# Patient Record
Sex: MALE | Race: OTHER | HISPANIC OR LATINO | URBAN - METROPOLITAN AREA
[De-identification: names, ages, dates, MRNs, and addresses within clinical notes are randomized per-mention and may not be internally consistent; named-entity substitution may affect disease eponyms.]

---

## 2024-11-26 ENCOUNTER — EMERGENCY (EMERGENCY)
Facility: HOSPITAL | Age: 59
LOS: 1 days | Discharge: HOME | End: 2024-11-26
Attending: STUDENT IN AN ORGANIZED HEALTH CARE EDUCATION/TRAINING PROGRAM
Payer: SELF-PAY

## 2024-11-26 VITALS
TEMPERATURE: 98 F | SYSTOLIC BLOOD PRESSURE: 133 MMHG | OXYGEN SATURATION: 97 % | HEART RATE: 76 BPM | DIASTOLIC BLOOD PRESSURE: 75 MMHG | RESPIRATION RATE: 20 BRPM

## 2024-11-26 VITALS
RESPIRATION RATE: 22 BRPM | SYSTOLIC BLOOD PRESSURE: 138 MMHG | OXYGEN SATURATION: 100 % | DIASTOLIC BLOOD PRESSURE: 88 MMHG | HEIGHT: 74 IN | WEIGHT: 214.95 LBS | TEMPERATURE: 98 F | HEART RATE: 85 BPM

## 2024-11-26 LAB
ALBUMIN SERPL ELPH-MCNC: 4.3 G/DL — SIGNIFICANT CHANGE UP (ref 3.5–5)
ALP SERPL-CCNC: 54 U/L — SIGNIFICANT CHANGE UP (ref 40–120)
ALT FLD-CCNC: 31 U/L DA — SIGNIFICANT CHANGE UP (ref 10–60)
ANION GAP SERPL CALC-SCNC: 6 MMOL/L — SIGNIFICANT CHANGE UP (ref 5–17)
APPEARANCE UR: ABNORMAL
AST SERPL-CCNC: 23 U/L — SIGNIFICANT CHANGE UP (ref 10–40)
BACTERIA # UR AUTO: ABNORMAL /HPF
BASOPHILS # BLD AUTO: 0.03 K/UL — SIGNIFICANT CHANGE UP (ref 0–0.2)
BASOPHILS NFR BLD AUTO: 0.3 % — SIGNIFICANT CHANGE UP (ref 0–2)
BILIRUB SERPL-MCNC: 0.9 MG/DL — SIGNIFICANT CHANGE UP (ref 0.2–1.2)
BILIRUB UR-MCNC: NEGATIVE — SIGNIFICANT CHANGE UP
BUN SERPL-MCNC: 21 MG/DL — HIGH (ref 7–18)
CALCIUM SERPL-MCNC: 9.4 MG/DL — SIGNIFICANT CHANGE UP (ref 8.4–10.5)
CHLORIDE SERPL-SCNC: 110 MMOL/L — HIGH (ref 96–108)
CO2 SERPL-SCNC: 26 MMOL/L — SIGNIFICANT CHANGE UP (ref 22–31)
COLOR SPEC: YELLOW — SIGNIFICANT CHANGE UP
CREAT SERPL-MCNC: 1.71 MG/DL — HIGH (ref 0.5–1.3)
DIFF PNL FLD: ABNORMAL
EGFR: 45 ML/MIN/1.73M2 — LOW
EOSINOPHIL # BLD AUTO: 0.02 K/UL — SIGNIFICANT CHANGE UP (ref 0–0.5)
EOSINOPHIL NFR BLD AUTO: 0.2 % — SIGNIFICANT CHANGE UP (ref 0–6)
EPI CELLS # UR: SIGNIFICANT CHANGE UP
GLUCOSE SERPL-MCNC: 177 MG/DL — HIGH (ref 70–99)
GLUCOSE UR QL: 100 MG/DL
HCT VFR BLD CALC: 40.4 % — SIGNIFICANT CHANGE UP (ref 39–50)
HGB BLD-MCNC: 13.6 G/DL — SIGNIFICANT CHANGE UP (ref 13–17)
IMM GRANULOCYTES NFR BLD AUTO: 0.3 % — SIGNIFICANT CHANGE UP (ref 0–0.9)
KETONES UR-MCNC: 15 MG/DL
LEUKOCYTE ESTERASE UR-ACNC: NEGATIVE — SIGNIFICANT CHANGE UP
LIDOCAIN IGE QN: 63 U/L — SIGNIFICANT CHANGE UP (ref 13–75)
LYMPHOCYTES # BLD AUTO: 0.77 K/UL — LOW (ref 1–3.3)
LYMPHOCYTES # BLD AUTO: 8.8 % — LOW (ref 13–44)
MCHC RBC-ENTMCNC: 31.9 PG — SIGNIFICANT CHANGE UP (ref 27–34)
MCHC RBC-ENTMCNC: 33.7 G/DL — SIGNIFICANT CHANGE UP (ref 32–36)
MCV RBC AUTO: 94.8 FL — SIGNIFICANT CHANGE UP (ref 80–100)
MONOCYTES # BLD AUTO: 0.35 K/UL — SIGNIFICANT CHANGE UP (ref 0–0.9)
MONOCYTES NFR BLD AUTO: 4 % — SIGNIFICANT CHANGE UP (ref 2–14)
NEUTROPHILS # BLD AUTO: 7.58 K/UL — HIGH (ref 1.8–7.4)
NEUTROPHILS NFR BLD AUTO: 86.4 % — HIGH (ref 43–77)
NITRITE UR-MCNC: NEGATIVE — SIGNIFICANT CHANGE UP
NRBC # BLD: 0 /100 WBCS — SIGNIFICANT CHANGE UP (ref 0–0)
PH UR: 8.5 (ref 5–8)
PLATELET # BLD AUTO: 252 K/UL — SIGNIFICANT CHANGE UP (ref 150–400)
POTASSIUM SERPL-MCNC: 3.8 MMOL/L — SIGNIFICANT CHANGE UP (ref 3.5–5.3)
POTASSIUM SERPL-SCNC: 3.8 MMOL/L — SIGNIFICANT CHANGE UP (ref 3.5–5.3)
PROT SERPL-MCNC: 8.1 G/DL — SIGNIFICANT CHANGE UP (ref 6–8.3)
PROT UR-MCNC: ABNORMAL MG/DL
RBC # BLD: 4.26 M/UL — SIGNIFICANT CHANGE UP (ref 4.2–5.8)
RBC # FLD: 13.5 % — SIGNIFICANT CHANGE UP (ref 10.3–14.5)
RBC CASTS # UR COMP ASSIST: ABNORMAL /HPF
SODIUM SERPL-SCNC: 142 MMOL/L — SIGNIFICANT CHANGE UP (ref 135–145)
SP GR SPEC: 1.03 — SIGNIFICANT CHANGE UP (ref 1–1.03)
UROBILINOGEN FLD QL: 1 MG/DL — SIGNIFICANT CHANGE UP (ref 0.2–1)
WBC # BLD: 8.78 K/UL — SIGNIFICANT CHANGE UP (ref 3.8–10.5)
WBC # FLD AUTO: 8.78 K/UL — SIGNIFICANT CHANGE UP (ref 3.8–10.5)
WBC UR QL: 1 /HPF — SIGNIFICANT CHANGE UP (ref 0–5)

## 2024-11-26 PROCEDURE — 99285 EMERGENCY DEPT VISIT HI MDM: CPT

## 2024-11-26 PROCEDURE — 36415 COLL VENOUS BLD VENIPUNCTURE: CPT

## 2024-11-26 PROCEDURE — 93005 ELECTROCARDIOGRAM TRACING: CPT

## 2024-11-26 PROCEDURE — 81001 URINALYSIS AUTO W/SCOPE: CPT

## 2024-11-26 PROCEDURE — 74177 CT ABD & PELVIS W/CONTRAST: CPT | Mod: 26,MC

## 2024-11-26 PROCEDURE — 96374 THER/PROPH/DIAG INJ IV PUSH: CPT

## 2024-11-26 PROCEDURE — 80053 COMPREHEN METABOLIC PANEL: CPT

## 2024-11-26 PROCEDURE — 99285 EMERGENCY DEPT VISIT HI MDM: CPT | Mod: 25

## 2024-11-26 PROCEDURE — 85025 COMPLETE CBC W/AUTO DIFF WBC: CPT

## 2024-11-26 PROCEDURE — 74177 CT ABD & PELVIS W/CONTRAST: CPT | Mod: MC

## 2024-11-26 PROCEDURE — 96375 TX/PRO/DX INJ NEW DRUG ADDON: CPT

## 2024-11-26 PROCEDURE — 83690 ASSAY OF LIPASE: CPT

## 2024-11-26 PROCEDURE — 96376 TX/PRO/DX INJ SAME DRUG ADON: CPT

## 2024-11-26 RX ORDER — ONDANSETRON HYDROCHLORIDE 2 MG/ML
4 INJECTION, SOLUTION INTRAMUSCULAR; INTRAVENOUS ONCE
Refills: 0 | Status: COMPLETED | OUTPATIENT
Start: 2024-11-26 | End: 2024-11-26

## 2024-11-26 RX ORDER — SODIUM CHLORIDE 9 MG/ML
1000 INJECTION, SOLUTION INTRAMUSCULAR; INTRAVENOUS; SUBCUTANEOUS ONCE
Refills: 0 | Status: COMPLETED | OUTPATIENT
Start: 2024-11-26 | End: 2024-11-26

## 2024-11-26 RX ORDER — OXYCODONE AND ACETAMINOPHEN 7.5; 325 MG/1; MG/1
1 TABLET ORAL
Qty: 10 | Refills: 0
Start: 2024-11-26

## 2024-11-26 RX ORDER — MORPHINE SULFATE 30 MG/1
4 TABLET, EXTENDED RELEASE ORAL ONCE
Refills: 0 | Status: COMPLETED | OUTPATIENT
Start: 2024-11-26 | End: 2024-11-26

## 2024-11-26 RX ORDER — TAMSULOSIN HCL 0.4 MG
1 CAPSULE ORAL
Qty: 20 | Refills: 0
Start: 2024-11-26

## 2024-11-26 RX ORDER — MORPHINE SULFATE 30 MG/1
4 TABLET, EXTENDED RELEASE ORAL ONCE
Refills: 0 | Status: DISCONTINUED | OUTPATIENT
Start: 2024-11-26 | End: 2024-11-26

## 2024-11-26 RX ADMIN — MORPHINE SULFATE 4 MILLIGRAM(S): 30 TABLET, EXTENDED RELEASE ORAL at 14:40

## 2024-11-26 RX ADMIN — ONDANSETRON HYDROCHLORIDE 4 MILLIGRAM(S): 2 INJECTION, SOLUTION INTRAMUSCULAR; INTRAVENOUS at 12:23

## 2024-11-26 RX ADMIN — ONDANSETRON HYDROCHLORIDE 4 MILLIGRAM(S): 2 INJECTION, SOLUTION INTRAMUSCULAR; INTRAVENOUS at 14:40

## 2024-11-26 RX ADMIN — MORPHINE SULFATE 4 MILLIGRAM(S): 30 TABLET, EXTENDED RELEASE ORAL at 12:22

## 2024-11-26 RX ADMIN — SODIUM CHLORIDE 1000 MILLILITER(S): 9 INJECTION, SOLUTION INTRAMUSCULAR; INTRAVENOUS; SUBCUTANEOUS at 12:23

## 2024-11-26 RX ADMIN — SODIUM CHLORIDE 1000 MILLILITER(S): 9 INJECTION, SOLUTION INTRAMUSCULAR; INTRAVENOUS; SUBCUTANEOUS at 19:06

## 2024-11-26 NOTE — CONSULT NOTE ADULT - SUBJECTIVE AND OBJECTIVE BOX
58 yo male pmhx CAD 3 mo ago had cardiac stents placed, now on plavix, to ER c/o n/v and upper abd pain l>r, associated n/v clear liquid x 1 day. no flank pain, no dysuria no gross hematuria.  Pt states he never had hx kidney stones in past. At present he states pain is much improved from this am. no n/v   no cp no sob no fever no dizzyness.    ICU Vital Signs Last 24 Hrs  T(C): 36.7 (26 Nov 2024 17:14), Max: 36.7 (26 Nov 2024 17:14)  T(F): 98 (26 Nov 2024 17:14), Max: 98 (26 Nov 2024 17:14)  HR: 76 (26 Nov 2024 17:14) (76 - 85)  BP: 133/75 (26 Nov 2024 17:14) (133/75 - 138/88)  BP(mean): --  ABP: --  ABP(mean): --  RR: 20 (26 Nov 2024 17:14) (20 - 22)  SpO2: 97% (26 Nov 2024 17:14) (97% - 100%)    O2 Parameters below as of 26 Nov 2024 17:14  Patient On (Oxygen Delivery Method): room air        Alert nad  Abd:soft nt nd no cvat  no penile/scrotal swelling or erythema                          13.6   8.78  )-----------( 252      ( 26 Nov 2024 13:38 )             40.4   11-26    142  |  110[H]  |  21[H]  ----------------------------<  177[H]  3.8   |  26  |  1.71[H]    Ca    9.4      26 Nov 2024 12:25    TPro  8.1  /  Alb  4.3  /  TBili  0.9  /  DBili  x   /  AST  23  /  ALT  31  /  AlkPhos  54  11-26  < from: CT Abdomen and Pelvis w/ IV Cont (11.26.24 @ 15:30) >  KIDNEYS/URETERS: Approximately 1.5 cm sized left UPJ calculus with   resultant mild hydronephrosis and delayed nephrogram as well as   perinephric fluid stranding. Multiple small nonobstructing calculi lower   pole of the left kidney. A combination of small cysts and indeterminate   hypodense foci right kidney.    BLADDER: Within normal limits.  REPRODUCTIVE ORGANS: Prostate is enlarged.    BOWEL: No bowel obstruction. Appendix is normal.  PERITONEUM/RETROPERITONEUM: Within normal limits.  VESSELS: Within normal limits.  LYMPH NODES: No lymphadenopathy.  ABDOMINAL WALL: Small fat-containing umbilical and supraumbilical hernias.  BONES: Within normal limits.    IMPRESSION:  Left UPJ calculus with resultant hydronephrosis.  Additional findings as above.      < end of copied text >   58 yo male pmhx CAD 3 mo ago had cardiac stents placed, now on plavix, to ER c/o n/v and upper abd pain l>r, associated n/v clear liquid x 1 day. no flank pain, no dysuria no gross hematuria.  Pt states he never had hx kidney stones in past. At present he states pain is much improved from this am. no n/v   no cp no sob no fever no dizziness. Lives in [facility]    ICU Vital Signs Last 24 Hrs  T(C): 36.7 (26 Nov 2024 17:14), Max: 36.7 (26 Nov 2024 17:14)  T(F): 98 (26 Nov 2024 17:14), Max: 98 (26 Nov 2024 17:14)  HR: 76 (26 Nov 2024 17:14) (76 - 85)  BP: 133/75 (26 Nov 2024 17:14) (133/75 - 138/88)  BP(mean): --  ABP: --  ABP(mean): --  RR: 20 (26 Nov 2024 17:14) (20 - 22)  SpO2: 97% (26 Nov 2024 17:14) (97% - 100%)    O2 Parameters below as of 26 Nov 2024 17:14  Patient On (Oxygen Delivery Method): room air        Alert nad  Abd:soft nt nd no cvat  no penile/scrotal swelling or erythema                          13.6   8.78  )-----------( 252      ( 26 Nov 2024 13:38 )             40.4   11-26    142  |  110[H]  |  21[H]  ----------------------------<  177[H]  3.8   |  26  |  1.71[H]    Ca    9.4      26 Nov 2024 12:25    TPro  8.1  /  Alb  4.3  /  TBili  0.9  /  DBili  x   /  AST  23  /  ALT  31  /  AlkPhos  54  11-26  < from: CT Abdomen and Pelvis w/ IV Cont (11.26.24 @ 15:30) >  KIDNEYS/URETERS: Approximately 1.5 cm sized left UPJ calculus with   resultant mild hydronephrosis and delayed nephrogram as well as   perinephric fluid stranding. Multiple small nonobstructing calculi lower   pole of the left kidney. A combination of small cysts and indeterminate   hypodense foci right kidney.    BLADDER: Within normal limits.  REPRODUCTIVE ORGANS: Prostate is enlarged.    BOWEL: No bowel obstruction. Appendix is normal.  PERITONEUM/RETROPERITONEUM: Within normal limits.  VESSELS: Within normal limits.  LYMPH NODES: No lymphadenopathy.  ABDOMINAL WALL: Small fat-containing umbilical and supraumbilical hernias.  BONES: Within normal limits.    IMPRESSION:  Left UPJ calculus with resultant hydronephrosis.  Additional findings as above.      < end of copied text >

## 2024-11-26 NOTE — ED PROVIDER NOTE - NSFOLLOWUPINSTRUCTIONS_ED_ALL_ED_FT
Cálculos renales    LO QUE NECESITA SABER:    ¿Qué son los cálculos renales?Los cálculos renales se demi en el sistema urinario cuando el agua y los residuos de la orina no están yan balanceados. Cuando esto sucede, ciertos tipos de concepcion de desecho se separan de la orina. Los concepcion se acumulan y demi piedras en los riñones. Los cálculos renales pueden estar compuestos de ácido úrico, calcio, fosfato o concepcion de oxalato. Podría tener más de un cálculo.  Cálculos renales    ¿Qué aumenta mi riesgo de cálculos renales?    No isacc suficientes líquidos (especialmente agua) todos los días    Tener infecciones frecuentes del tracto urinario    Demasiada cantidad de ciertos alimentos, sujatha carne, sal, nueces y chocolate    Obesidad    Ciertos medicamentos, sujatha diuréticos, esteroides y antiácidos    Antecedentes familiares de cálculos renales    Nacer con un trastorno renal o intestinal  ¿Cuáles son los signos y síntomas de los cálculos renales?    Dolor en la parte media de la espalda que se mueve a través de un costado o que podría propagarse a la niels    Náuseas y vómitos    Necesidad de orinar con frecuencia, sensación de ardor al orinar u orina color ervin o titus    Sensibilidad en la parte inferior de la espalda, en el costado o en el estómago  ¿Cómo se diagnostican los cálculos renales?El médico va a hacerle preguntas sobre allison benja y alimentos habituales. El médico podría derivarlo a un urólogo. Es posible que usted necesite exámenes para determinar el tipo de cálculos renales que tiene. Los exámenes pueden mostrar el tamaño de los cálculos y en dónde se encuentran en el sistema urinario. Usted podría necesitar más de brea de los siguientes:    Los análisis de orinapodrían mostrar si usted tiene rhys en la orina. También podrían mostrar altas cantidades de la sustancia que forma los cálculos renales, sujatha el ácido úrico.    Los análisis de sangremuestran lo yan que funcionan alvina riñones. También podrían utilizarse para revisar los niveles de calcio o de ácido úrico en la rhys.    Kaylan radiografía o ultrasonidose pueden isacc de alvina riñones, vejiga y uréteres. Es posible que le administren un líquido de contraste antes de la radiografía para que se vean con mayor claridad en las imágenes. Usted podría necesitar que le realicen más de kaylan radiografía. Dígale al médico si usted alguna vez ha tenido kaylan reacción alérgica al líquido de contraste.  ¿Cómo se tratan los cálculos renales?    AINEcomo el ibuprofeno, ayudan a disminuir la inflamación, el dolor y la fiebre. Jamila medicamento está disponible con o sin kaylan receta médica. Los CAROLE pueden causar sangrado estomacal o problemas renales en ciertas personas. Si usted emma un medicamento anticoagulante, siempre pregúntele a allison médico si los CAROLE son seguros para usted. Siempre karo la etiqueta de jamila medicamento y siga las instrucciones.    Acetaminofénalivia el dolor y baja la fiebre. Está disponible sin receta médica. Pregunte la cantidad y la frecuencia con que debe tomarlos. Siga las indicaciones. Karo las etiquetas de todos los demás medicamentos que esté usando para saber si también contienen acetaminofén, o pregunte a allison médico o farmacéutico. El acetaminofén puede causar daño en el hígado cuando no se emma de forma correcta.    Puede administrarsepodrían administrarse. Pregunte al médico cómo debe isacc jamila medicamento de forma hernandez. Algunos medicamentos recetados para el dolor contienen acetaminofén. No tome otros medicamentos que contengan acetaminofén sin consultarlo con allison médico. Demasiado acetaminofeno puede causar daño al hígado. Los medicamentos recetados para el dolor podrían causar estreñimiento. Pregunte a allison médico sujatha prevenir o tratar estreñimiento.    Los medicamentospara balancear el nivel de los electrolitos.    Un procedimiento o kaylan cirugíapara remover los cálculos renales si no se eliminan por sí solos. El tratamiento dependerá del tamaño y ubicación de los cálculos renales.  ¿Qué puedo hacer para controlar mis cálculos?    Ingiera más líquidos.Es probable que allison médico le indique que tome al menos 8 a 12 vasos (de ocho onzas) de líquidos al día. [place] ayuda a deshacerse de los cálculos renales cuando usted orina. El agua es el mejor líquido para isacc.    Cuele la orina cada vez que use el baño.Orine por medio de un colador o un pedazo de ifrah romero para así recolectar los cálculos. Lleve los cálculos donde allison médico para que pueda enviarlos al laboratorio y realizarles exámenes. [place] ayudará a allison médico a planear el mejor tratamiento para usted.  Buscar piedras en el filtro      Pregunte si debe evitar algún alimento.Es posible que necesite limitar el oxalato. El oxalato es kaylan sustancia química que se encuentra en algunos alimentos de origen vegetal. El tipo de cálculo renal más común se compone de concepcion que contienen calcio y oxalato. Allison médico o dietista podría recomendar que limite el consumo de oxalato si sufre de jamila tipo de cálculo renal frecuentemente. Es probable que usted tenga que limitar la cantidad de sodio (sal) o proteínas que usted come. Pida más información sobre los mejores alimentos para usted.  Alimentos con alto contenido de oxalatos      Manténgase físicamente activo según las instrucciones.Alvina cálculos pueden pasar con más facilidad si usted permanece activo. La actividad física también puede ayudarle a controlar el peso. Pregunte sobre cuáles son las mejores actividades para usted.  Kar hispana caminando sujatha ejercicio  ¿Cuándo corona buscar atención inmediata?    Tiene vómitos y no se alivian con medicamentos.    ¿Cuándo corona llamar a mi médico?    Tiene fiebre.    Tiene dificultad para orinar.    Usted orina con rhys.    Usted tiene dolor intenso.    Tiene alguna pregunta o inquietud acerca de allison condición o cuidado.  ACUERDOS SOBRE ALLISON CUIDADO:    Usted tiene el derecho de ayudar a planear allison cuidado. Aprenda todo lo que pueda sobre allison condición y sujatha darle tratamiento. Discuta alvina opciones de tratamiento con alvina médicos para decidir el cuidado que usted desea recibir. Usted siempre tiene el derecho de rechazar el tratamiento.    © [employer] 1973, 2024

## 2024-11-26 NOTE — CONSULT NOTE ADULT - ASSESSMENT
left upj stone and hydronephrosis  abd pain improved, nausea improved  no fever, no dysuria.  mild elevated createnine    Reviewed case and studies with Dr Florentino, REcommended to pt for admission and stent placement.  Pt states that he feels better and he would rather go back to hos home [facility] where his cardiologist is and can see a urologist in [facility].  Discussed with pt the risks of leaving the hospital including possible dehydration if unable to tolerate po intake which can lead to cardiac complications and even heart attack.  Also discussed with pt that he must return to ER immediately if fever as he would be at risk of sepsis and death if develops infection with hydronephrosis/stone.  Pt states he is able to keep po intake down without vomiting and he refuses offered admission and procedure. Advised pt that he should not wait to go back to see his Urologist and should book his travel home ASAP if he refuses treatment here in [place] and that he should return to our ER if unable to keep po fluids/food down, or if fever, or if continued or worsening pain.  Pt understands and will follow up with a doctor in [facility].   left upj stone and hydronephrosis  abd pain improved, nausea improved  no fever, no dysuria.  mild elevated creatinine    Reviewed case and studies with Dr Florentino, Recommended to pt for admission and stent placement.  Pt states that he feels better and he would rather go back to his home in [facility] where his cardiologist is and can see a urologist in [facility].  Discussed with pt the risks of leaving the hospital including possible dehydration if unable to tolerate po intake which can lead to cardiac complications and even heart attack.  Also discussed with pt that he must return to ER immediately if fever as he would be at risk of sepsis and death if develops infection with hydronephrosis/stone.  Pt states he is able to keep po intake down without vomiting and he refuses offered admission and procedure. Advised pt that he should not wait to go back to see his Urologist and should book his travel home ASAP if he refuses treatment here in [place] and that he should return to our ER if unable to keep po fluids/food down, or if fever, or if continued or worsening pain.  Pt understands and will follow up with a doctor in [facility].

## 2024-11-26 NOTE — ED PROVIDER NOTE - PATIENT PORTAL LINK FT
You can access the FollowMyHealth Patient Portal offered by [facility] by registering at the following website: [email or web address]. By joining [facility]’s FollowMyHealth portal, you will also be able to view your health information using other applications (apps) compatible with our system.

## 2024-11-26 NOTE — ED PROVIDER NOTE - CLINICAL SUMMARY MEDICAL DECISION MAKING FREE TEXT BOX
Presented abdominal pain no peritoneal will obtain labs CT rule out surgical abdomen pain control reassess

## 2024-11-26 NOTE — ED PROVIDER NOTE - OBJECTIVE STATEMENT
59-year-old presenting with abdominal pain endorses symptoms started 1 hour prior to arrival endorses nausea and vomiting denies any diarrhea dysuria travel sick contact chest pain headache shortness of breath